# Patient Record
Sex: FEMALE | Race: BLACK OR AFRICAN AMERICAN | NOT HISPANIC OR LATINO | URBAN - METROPOLITAN AREA
[De-identification: names, ages, dates, MRNs, and addresses within clinical notes are randomized per-mention and may not be internally consistent; named-entity substitution may affect disease eponyms.]

---
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
## 2024-05-31 NOTE — ED PROVIDER NOTE - NS ED ATTENDING STATEMENT MOD
Orders only   I have personally seen and examined this patient. I have fully participated in the care of this patient. I have reviewed all pertinent clinical information, including history physical exam, plan and the Resident's note and agree except as noted

## 2025-04-19 NOTE — ED PEDIATRIC TRIAGE NOTE - ADDITIONAL COMPLAINTS
Additional Complaints 09/2024: Intra/extra-hepatic biliary ductal dilation seen on CT imaging. Patient apparently following with hepatology annually for Hep C treated in past, no diagnosed history of other liver disease, no stones seen on imaging. Possibly obstruction iso malignancy with mets to liver.  09/2024 MRCP showing mild intra and extrahepatic ductal dilatation. No choledocholithiasis or mass lesion. No evidence of an ampullary or pancreatic head mass  As per GI, possibly secondary to opiod use    f/u outpatient